# Patient Record
Sex: MALE | Race: ASIAN | NOT HISPANIC OR LATINO | URBAN - METROPOLITAN AREA
[De-identification: names, ages, dates, MRNs, and addresses within clinical notes are randomized per-mention and may not be internally consistent; named-entity substitution may affect disease eponyms.]

---
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
## 2019-11-25 NOTE — ED PROVIDER NOTE - CPE EDP CARDIAC NORM
[GI Symptoms] : GI SYMPTOMS [Decreased Appetite] : decreased appetite [Periumbilical] : periumbilical [___ Day(s)] : [unfilled] day(s) [Intermittent] : intermittent [Eating] : eating [Last Void: ___] : Last void: [unfilled] [Sick Contacts: ___] : sick contacts: [unfilled] [normal...

## 2020-07-08 NOTE — ED PROVIDER NOTE - PSYCHIATRIC LEVEL OF CONSCIOUSNESS
Patient's chart was reviewed for overdue ENA topics.  Immunizations reconciled.    Orders placed:n/a  Tasked appts:n/a  Labs Linked:n/a      
alert/follows commands

## 2023-11-04 ENCOUNTER — EMERGENCY (EMERGENCY)
Facility: HOSPITAL | Age: 23
LOS: 1 days | Discharge: HOME | End: 2023-11-04
Admitting: EMERGENCY MEDICINE
Payer: MEDICAID

## 2023-11-04 VITALS
SYSTOLIC BLOOD PRESSURE: 141 MMHG | TEMPERATURE: 98 F | DIASTOLIC BLOOD PRESSURE: 100 MMHG | HEART RATE: 115 BPM | OXYGEN SATURATION: 100 %

## 2023-11-04 DIAGNOSIS — F20.9 SCHIZOPHRENIA, UNSPECIFIED: ICD-10-CM

## 2023-11-04 LAB
ALBUMIN SERPL ELPH-MCNC: 4.8 G/DL — SIGNIFICANT CHANGE UP (ref 3.3–5)
ALBUMIN SERPL ELPH-MCNC: 4.8 G/DL — SIGNIFICANT CHANGE UP (ref 3.3–5)
ALP SERPL-CCNC: 70 U/L — SIGNIFICANT CHANGE UP (ref 40–120)
ALP SERPL-CCNC: 70 U/L — SIGNIFICANT CHANGE UP (ref 40–120)
ALT FLD-CCNC: 19 U/L — SIGNIFICANT CHANGE UP (ref 4–41)
ALT FLD-CCNC: 19 U/L — SIGNIFICANT CHANGE UP (ref 4–41)
AMPHET UR-MCNC: NEGATIVE — SIGNIFICANT CHANGE UP
AMPHET UR-MCNC: NEGATIVE — SIGNIFICANT CHANGE UP
ANION GAP SERPL CALC-SCNC: 10 MMOL/L — SIGNIFICANT CHANGE UP (ref 7–14)
ANION GAP SERPL CALC-SCNC: 10 MMOL/L — SIGNIFICANT CHANGE UP (ref 7–14)
APAP SERPL-MCNC: <10 UG/ML — LOW (ref 15–25)
APAP SERPL-MCNC: <10 UG/ML — LOW (ref 15–25)
APPEARANCE UR: ABNORMAL
APPEARANCE UR: ABNORMAL
AST SERPL-CCNC: 19 U/L — SIGNIFICANT CHANGE UP (ref 4–40)
AST SERPL-CCNC: 19 U/L — SIGNIFICANT CHANGE UP (ref 4–40)
BACTERIA # UR AUTO: NEGATIVE /HPF — SIGNIFICANT CHANGE UP
BACTERIA # UR AUTO: NEGATIVE /HPF — SIGNIFICANT CHANGE UP
BARBITURATES UR SCN-MCNC: NEGATIVE — SIGNIFICANT CHANGE UP
BARBITURATES UR SCN-MCNC: NEGATIVE — SIGNIFICANT CHANGE UP
BASOPHILS # BLD AUTO: 0.03 K/UL — SIGNIFICANT CHANGE UP (ref 0–0.2)
BASOPHILS # BLD AUTO: 0.03 K/UL — SIGNIFICANT CHANGE UP (ref 0–0.2)
BASOPHILS NFR BLD AUTO: 0.3 % — SIGNIFICANT CHANGE UP (ref 0–2)
BASOPHILS NFR BLD AUTO: 0.3 % — SIGNIFICANT CHANGE UP (ref 0–2)
BENZODIAZ UR-MCNC: NEGATIVE — SIGNIFICANT CHANGE UP
BENZODIAZ UR-MCNC: NEGATIVE — SIGNIFICANT CHANGE UP
BILIRUB SERPL-MCNC: 0.3 MG/DL — SIGNIFICANT CHANGE UP (ref 0.2–1.2)
BILIRUB SERPL-MCNC: 0.3 MG/DL — SIGNIFICANT CHANGE UP (ref 0.2–1.2)
BILIRUB UR-MCNC: NEGATIVE — SIGNIFICANT CHANGE UP
BILIRUB UR-MCNC: NEGATIVE — SIGNIFICANT CHANGE UP
BUN SERPL-MCNC: 11 MG/DL — SIGNIFICANT CHANGE UP (ref 7–23)
BUN SERPL-MCNC: 11 MG/DL — SIGNIFICANT CHANGE UP (ref 7–23)
CALCIUM SERPL-MCNC: 9.6 MG/DL — SIGNIFICANT CHANGE UP (ref 8.4–10.5)
CALCIUM SERPL-MCNC: 9.6 MG/DL — SIGNIFICANT CHANGE UP (ref 8.4–10.5)
CAST: 0 /LPF — SIGNIFICANT CHANGE UP (ref 0–4)
CAST: 0 /LPF — SIGNIFICANT CHANGE UP (ref 0–4)
CHLORIDE SERPL-SCNC: 101 MMOL/L — SIGNIFICANT CHANGE UP (ref 98–107)
CHLORIDE SERPL-SCNC: 101 MMOL/L — SIGNIFICANT CHANGE UP (ref 98–107)
CO2 SERPL-SCNC: 27 MMOL/L — SIGNIFICANT CHANGE UP (ref 22–31)
CO2 SERPL-SCNC: 27 MMOL/L — SIGNIFICANT CHANGE UP (ref 22–31)
COCAINE METAB.OTHER UR-MCNC: NEGATIVE — SIGNIFICANT CHANGE UP
COCAINE METAB.OTHER UR-MCNC: NEGATIVE — SIGNIFICANT CHANGE UP
COLOR SPEC: YELLOW — SIGNIFICANT CHANGE UP
COLOR SPEC: YELLOW — SIGNIFICANT CHANGE UP
CREAT SERPL-MCNC: 0.92 MG/DL — SIGNIFICANT CHANGE UP (ref 0.5–1.3)
CREAT SERPL-MCNC: 0.92 MG/DL — SIGNIFICANT CHANGE UP (ref 0.5–1.3)
CREATININE URINE RESULT, DAU: 158 MG/DL — SIGNIFICANT CHANGE UP
CREATININE URINE RESULT, DAU: 158 MG/DL — SIGNIFICANT CHANGE UP
DIFF PNL FLD: NEGATIVE — SIGNIFICANT CHANGE UP
DIFF PNL FLD: NEGATIVE — SIGNIFICANT CHANGE UP
EGFR: 119 ML/MIN/1.73M2 — SIGNIFICANT CHANGE UP
EGFR: 119 ML/MIN/1.73M2 — SIGNIFICANT CHANGE UP
EOSINOPHIL # BLD AUTO: 0.11 K/UL — SIGNIFICANT CHANGE UP (ref 0–0.5)
EOSINOPHIL # BLD AUTO: 0.11 K/UL — SIGNIFICANT CHANGE UP (ref 0–0.5)
EOSINOPHIL NFR BLD AUTO: 1.2 % — SIGNIFICANT CHANGE UP (ref 0–6)
EOSINOPHIL NFR BLD AUTO: 1.2 % — SIGNIFICANT CHANGE UP (ref 0–6)
ETHANOL SERPL-MCNC: <10 MG/DL — SIGNIFICANT CHANGE UP
ETHANOL SERPL-MCNC: <10 MG/DL — SIGNIFICANT CHANGE UP
GLUCOSE SERPL-MCNC: 124 MG/DL — HIGH (ref 70–99)
GLUCOSE SERPL-MCNC: 124 MG/DL — HIGH (ref 70–99)
GLUCOSE UR QL: NEGATIVE MG/DL — SIGNIFICANT CHANGE UP
GLUCOSE UR QL: NEGATIVE MG/DL — SIGNIFICANT CHANGE UP
HCT VFR BLD CALC: 41.8 % — SIGNIFICANT CHANGE UP (ref 39–50)
HCT VFR BLD CALC: 41.8 % — SIGNIFICANT CHANGE UP (ref 39–50)
HGB BLD-MCNC: 14.2 G/DL — SIGNIFICANT CHANGE UP (ref 13–17)
HGB BLD-MCNC: 14.2 G/DL — SIGNIFICANT CHANGE UP (ref 13–17)
IANC: 6.46 K/UL — SIGNIFICANT CHANGE UP (ref 1.8–7.4)
IANC: 6.46 K/UL — SIGNIFICANT CHANGE UP (ref 1.8–7.4)
IMM GRANULOCYTES NFR BLD AUTO: 0.4 % — SIGNIFICANT CHANGE UP (ref 0–0.9)
IMM GRANULOCYTES NFR BLD AUTO: 0.4 % — SIGNIFICANT CHANGE UP (ref 0–0.9)
KETONES UR-MCNC: NEGATIVE MG/DL — SIGNIFICANT CHANGE UP
KETONES UR-MCNC: NEGATIVE MG/DL — SIGNIFICANT CHANGE UP
LEUKOCYTE ESTERASE UR-ACNC: NEGATIVE — SIGNIFICANT CHANGE UP
LEUKOCYTE ESTERASE UR-ACNC: NEGATIVE — SIGNIFICANT CHANGE UP
LYMPHOCYTES # BLD AUTO: 1.79 K/UL — SIGNIFICANT CHANGE UP (ref 1–3.3)
LYMPHOCYTES # BLD AUTO: 1.79 K/UL — SIGNIFICANT CHANGE UP (ref 1–3.3)
LYMPHOCYTES # BLD AUTO: 20 % — SIGNIFICANT CHANGE UP (ref 13–44)
LYMPHOCYTES # BLD AUTO: 20 % — SIGNIFICANT CHANGE UP (ref 13–44)
MCHC RBC-ENTMCNC: 28 PG — SIGNIFICANT CHANGE UP (ref 27–34)
MCHC RBC-ENTMCNC: 28 PG — SIGNIFICANT CHANGE UP (ref 27–34)
MCHC RBC-ENTMCNC: 34 GM/DL — SIGNIFICANT CHANGE UP (ref 32–36)
MCHC RBC-ENTMCNC: 34 GM/DL — SIGNIFICANT CHANGE UP (ref 32–36)
MCV RBC AUTO: 82.4 FL — SIGNIFICANT CHANGE UP (ref 80–100)
MCV RBC AUTO: 82.4 FL — SIGNIFICANT CHANGE UP (ref 80–100)
METHADONE UR-MCNC: NEGATIVE — SIGNIFICANT CHANGE UP
METHADONE UR-MCNC: NEGATIVE — SIGNIFICANT CHANGE UP
MONOCYTES # BLD AUTO: 0.54 K/UL — SIGNIFICANT CHANGE UP (ref 0–0.9)
MONOCYTES # BLD AUTO: 0.54 K/UL — SIGNIFICANT CHANGE UP (ref 0–0.9)
MONOCYTES NFR BLD AUTO: 6 % — SIGNIFICANT CHANGE UP (ref 2–14)
MONOCYTES NFR BLD AUTO: 6 % — SIGNIFICANT CHANGE UP (ref 2–14)
NEUTROPHILS # BLD AUTO: 6.46 K/UL — SIGNIFICANT CHANGE UP (ref 1.8–7.4)
NEUTROPHILS # BLD AUTO: 6.46 K/UL — SIGNIFICANT CHANGE UP (ref 1.8–7.4)
NEUTROPHILS NFR BLD AUTO: 72.1 % — SIGNIFICANT CHANGE UP (ref 43–77)
NEUTROPHILS NFR BLD AUTO: 72.1 % — SIGNIFICANT CHANGE UP (ref 43–77)
NITRITE UR-MCNC: NEGATIVE — SIGNIFICANT CHANGE UP
NITRITE UR-MCNC: NEGATIVE — SIGNIFICANT CHANGE UP
NRBC # BLD: 0 /100 WBCS — SIGNIFICANT CHANGE UP (ref 0–0)
NRBC # BLD: 0 /100 WBCS — SIGNIFICANT CHANGE UP (ref 0–0)
NRBC # FLD: 0 K/UL — SIGNIFICANT CHANGE UP (ref 0–0)
NRBC # FLD: 0 K/UL — SIGNIFICANT CHANGE UP (ref 0–0)
OPIATES UR-MCNC: NEGATIVE — SIGNIFICANT CHANGE UP
OPIATES UR-MCNC: NEGATIVE — SIGNIFICANT CHANGE UP
OXYCODONE UR-MCNC: NEGATIVE — SIGNIFICANT CHANGE UP
OXYCODONE UR-MCNC: NEGATIVE — SIGNIFICANT CHANGE UP
PCP SPEC-MCNC: SIGNIFICANT CHANGE UP
PCP SPEC-MCNC: SIGNIFICANT CHANGE UP
PCP UR-MCNC: NEGATIVE — SIGNIFICANT CHANGE UP
PCP UR-MCNC: NEGATIVE — SIGNIFICANT CHANGE UP
PH UR: 7.5 — SIGNIFICANT CHANGE UP (ref 5–8)
PH UR: 7.5 — SIGNIFICANT CHANGE UP (ref 5–8)
PLATELET # BLD AUTO: 259 K/UL — SIGNIFICANT CHANGE UP (ref 150–400)
PLATELET # BLD AUTO: 259 K/UL — SIGNIFICANT CHANGE UP (ref 150–400)
POTASSIUM SERPL-MCNC: 4 MMOL/L — SIGNIFICANT CHANGE UP (ref 3.5–5.3)
POTASSIUM SERPL-MCNC: 4 MMOL/L — SIGNIFICANT CHANGE UP (ref 3.5–5.3)
POTASSIUM SERPL-SCNC: 4 MMOL/L — SIGNIFICANT CHANGE UP (ref 3.5–5.3)
POTASSIUM SERPL-SCNC: 4 MMOL/L — SIGNIFICANT CHANGE UP (ref 3.5–5.3)
PROT SERPL-MCNC: 7.7 G/DL — SIGNIFICANT CHANGE UP (ref 6–8.3)
PROT SERPL-MCNC: 7.7 G/DL — SIGNIFICANT CHANGE UP (ref 6–8.3)
PROT UR-MCNC: NEGATIVE MG/DL — SIGNIFICANT CHANGE UP
PROT UR-MCNC: NEGATIVE MG/DL — SIGNIFICANT CHANGE UP
RBC # BLD: 5.07 M/UL — SIGNIFICANT CHANGE UP (ref 4.2–5.8)
RBC # BLD: 5.07 M/UL — SIGNIFICANT CHANGE UP (ref 4.2–5.8)
RBC # FLD: 12.3 % — SIGNIFICANT CHANGE UP (ref 10.3–14.5)
RBC # FLD: 12.3 % — SIGNIFICANT CHANGE UP (ref 10.3–14.5)
RBC CASTS # UR COMP ASSIST: 1 /HPF — SIGNIFICANT CHANGE UP (ref 0–4)
RBC CASTS # UR COMP ASSIST: 1 /HPF — SIGNIFICANT CHANGE UP (ref 0–4)
SALICYLATES SERPL-MCNC: <0.3 MG/DL — LOW (ref 15–30)
SALICYLATES SERPL-MCNC: <0.3 MG/DL — LOW (ref 15–30)
SARS-COV-2 RNA SPEC QL NAA+PROBE: SIGNIFICANT CHANGE UP
SARS-COV-2 RNA SPEC QL NAA+PROBE: SIGNIFICANT CHANGE UP
SODIUM SERPL-SCNC: 138 MMOL/L — SIGNIFICANT CHANGE UP (ref 135–145)
SODIUM SERPL-SCNC: 138 MMOL/L — SIGNIFICANT CHANGE UP (ref 135–145)
SP GR SPEC: 1.02 — SIGNIFICANT CHANGE UP (ref 1–1.03)
SP GR SPEC: 1.02 — SIGNIFICANT CHANGE UP (ref 1–1.03)
SQUAMOUS # UR AUTO: 0 /HPF — SIGNIFICANT CHANGE UP (ref 0–5)
SQUAMOUS # UR AUTO: 0 /HPF — SIGNIFICANT CHANGE UP (ref 0–5)
THC UR QL: NEGATIVE — SIGNIFICANT CHANGE UP
THC UR QL: NEGATIVE — SIGNIFICANT CHANGE UP
TOXICOLOGY SCREEN, DRUGS OF ABUSE, SERUM RESULT: SIGNIFICANT CHANGE UP
TOXICOLOGY SCREEN, DRUGS OF ABUSE, SERUM RESULT: SIGNIFICANT CHANGE UP
TSH SERPL-MCNC: 1.37 UIU/ML — SIGNIFICANT CHANGE UP (ref 0.27–4.2)
TSH SERPL-MCNC: 1.37 UIU/ML — SIGNIFICANT CHANGE UP (ref 0.27–4.2)
UROBILINOGEN FLD QL: 1 MG/DL — SIGNIFICANT CHANGE UP (ref 0.2–1)
UROBILINOGEN FLD QL: 1 MG/DL — SIGNIFICANT CHANGE UP (ref 0.2–1)
WBC # BLD: 8.97 K/UL — SIGNIFICANT CHANGE UP (ref 3.8–10.5)
WBC # BLD: 8.97 K/UL — SIGNIFICANT CHANGE UP (ref 3.8–10.5)
WBC # FLD AUTO: 8.97 K/UL — SIGNIFICANT CHANGE UP (ref 3.8–10.5)
WBC # FLD AUTO: 8.97 K/UL — SIGNIFICANT CHANGE UP (ref 3.8–10.5)
WBC UR QL: 2 /HPF — SIGNIFICANT CHANGE UP (ref 0–5)
WBC UR QL: 2 /HPF — SIGNIFICANT CHANGE UP (ref 0–5)

## 2023-11-04 PROCEDURE — 99285 EMERGENCY DEPT VISIT HI MDM: CPT | Mod: GC

## 2023-11-04 PROCEDURE — 93010 ELECTROCARDIOGRAM REPORT: CPT

## 2023-11-04 PROCEDURE — 99285 EMERGENCY DEPT VISIT HI MDM: CPT

## 2023-11-04 RX ORDER — GABAPENTIN 400 MG/1
200 CAPSULE ORAL AT BEDTIME
Refills: 0 | Status: DISCONTINUED | OUTPATIENT
Start: 2023-11-04 | End: 2023-11-08

## 2023-11-04 RX ORDER — SERTRALINE 25 MG/1
50 TABLET, FILM COATED ORAL DAILY
Refills: 0 | Status: DISCONTINUED | OUTPATIENT
Start: 2023-11-04 | End: 2023-11-08

## 2023-11-04 RX ORDER — BENZTROPINE MESYLATE 1 MG
1 TABLET ORAL DAILY
Refills: 0 | Status: DISCONTINUED | OUTPATIENT
Start: 2023-11-04 | End: 2023-11-08

## 2023-11-04 RX ORDER — RISPERIDONE 4 MG/1
2 TABLET ORAL
Refills: 0 | Status: DISCONTINUED | OUTPATIENT
Start: 2023-11-04 | End: 2023-11-08

## 2023-11-04 RX ORDER — LURASIDONE HYDROCHLORIDE 40 MG/1
20 TABLET ORAL AT BEDTIME
Refills: 0 | Status: DISCONTINUED | OUTPATIENT
Start: 2023-11-04 | End: 2023-11-08

## 2023-11-04 RX ORDER — GABAPENTIN 400 MG/1
100 CAPSULE ORAL DAILY
Refills: 0 | Status: DISCONTINUED | OUTPATIENT
Start: 2023-11-04 | End: 2023-11-08

## 2023-11-04 RX ADMIN — GABAPENTIN 200 MILLIGRAM(S): 400 CAPSULE ORAL at 23:07

## 2023-11-04 RX ADMIN — SERTRALINE 50 MILLIGRAM(S): 25 TABLET, FILM COATED ORAL at 18:58

## 2023-11-04 RX ADMIN — RISPERIDONE 2 MILLIGRAM(S): 4 TABLET ORAL at 18:58

## 2023-11-04 NOTE — ED BEHAVIORAL HEALTH ASSESSMENT NOTE - SUMMARY
Patient is a 24 yo male, single, lives with family, graduated college with bachelors in computer science, currently unemployed, with a PPHx of Schizophrenia, 1 past psych admission ([facility] in 01/2023), no h/o SA, no NSSIB, no legal history, no substance use, who presents to ED BIB family for agitation.     Patient presents with thought disorder with disorganized thought process, psychotic ambivalence, unable to give proper history, though admits to recent irritability and damage towards property. Family report that patient has been irritable for months, has had recent damage towards property which resulted in him bruising his hand, and has been having delusions. They advocate for admission. Patient requires inpatient psychiatric admission due to psychosis, damage towards property, presents as a danger to himself due to disorganized behaviors and delusions. Patient is a 22 yo male, single, lives with family, graduated college with bachelors in computer science, currently unemployed, with a PPHx of Schizophrenia, 1 past psych admission ([facility] in 01/2023), no h/o SA, no NSSIB, no legal history, no substance use, who presents to ED BIB family for agitation. Patient presents with thought disorder with disorganized thought process, psychotic ambivalence, unable to give proper history, though admits to recent irritability and damage towards property. Family report that patient has been irritable for months, has had recent damage towards property which resulted in him bruising his hand, and has been having delusions. They advocate for admission. Patient requires inpatient psychiatric admission due to psychosis, damage towards property, presents as a danger to himself due to disorganized behaviors and delusions.

## 2023-11-04 NOTE — ED BEHAVIORAL HEALTH ASSESSMENT NOTE - PSYCHIATRIC ISSUES AND PLAN (INCLUDE STANDING AND PRN MEDICATION)
Increase PM Risperdal dose to 3mg, decrease Latuda to 20mg with dinner, c/w Risperdal 1mg qAM, gabapentin 100mg qAM, 200mg qhs, Zoloft 50mg qAM, Cogentin 1mg qhs

## 2023-11-04 NOTE — ED ADULT TRIAGE NOTE - CHIEF COMPLAINT QUOTE
pt with schizophrenia, med compliant. brought in by sister. pt believes he is the profit from [employer] and the [place] is after him. pt has been punching walls in the house.

## 2023-11-04 NOTE — ED BEHAVIORAL HEALTH ASSESSMENT NOTE - CURRENT MEDICATION
Risperdal 1mg qAM, 2mg qPM, gabapentin 100mg qAM, 200mg qhs, Zoloft 50mg qAM, Cogentin 1mg qhs, Lurasidone 40mg qhs

## 2023-11-04 NOTE — ED PROVIDER NOTE - PROGRESS NOTE DETAILS
[place] ALEJANDRO: Received sign out from my colleague Dr. Leonard pt boarding in  area for w c/f de comp schizophrenia, labs reviewed, pt resting comfortable in  area. YOLI: Pt was signed out to me pending admission/transfer to an inpatient psych facility. Pt has no complaints. Will continue to monitor pending bed availability. Med cleared by labs.

## 2023-11-04 NOTE — ED ADULT NURSE NOTE - OBJECTIVE STATEMENT
Hx: Schizophrenia. Pt from home endorsing argument that never got physical, stating his family brought him here. Pt denies getting physical/destroying property contrary to triage note. Endorses med noncompliance. Appears paranoid/skeptical. Denies; S/I, SIB, H/I, hallucinations, ETOH/drug use, insomnia, decrease in appetite.

## 2023-11-04 NOTE — ED PROVIDER NOTE - CLINICAL SUMMARY MEDICAL DECISION MAKING FREE TEXT BOX
24 y/o M  hx Schizophrenia   Labs, Urine Tox/UA, EKG. X-ray hand    Medical evaluation performed. There is no clinical evidence of intoxication or any acute medical problem requiring immediate intervention. Patient is awaiting psychiatric consultation. Final disposition will be determined by psychiatrist. 22 y/o M  hx Schizophrenia   Labs, Urine Tox/UA, EKG. X-ray hand    Punched wall / Redness and swelling to 3rd & 5th  metacarpals.    Medical evaluation performed. There is no clinical evidence of intoxication or any acute medical problem requiring immediate intervention. Patient is awaiting psychiatric consultation. Final disposition will be determined by psychiatrist.

## 2023-11-04 NOTE — ED BEHAVIORAL HEALTH ASSESSMENT NOTE - DESCRIPTION
Lives with 2 parents and older sister, graduated from [employer] in 2022 with bachelors in computer science Low iron Pt calm and cooperative in ED

## 2023-11-04 NOTE — ED BEHAVIORAL HEALTH ASSESSMENT NOTE - DETAILS
Pending available bed Paternal aunt with psychiatric illness, likely depression or anxiety, on Zoloft Damaging rooms at home Spoke with family in ED Pt denies

## 2023-11-04 NOTE — ED PROVIDER NOTE - OBJECTIVE STATEMENT
24 y/o M  hx Schizophrenia  BIBA secondary to a " schizophrenic attack". Appears paranoid and internally preoccupied. Denies SI/HI/AH/VH. Admits to punching a wall . Denies falling, or kicking any objects. Denies pain, SOB, fever, chills, dizziness , chest/abdominal discomfort.  Denies use of alcohol  or illicit drugs.

## 2023-11-04 NOTE — ED BEHAVIORAL HEALTH ASSESSMENT NOTE - RISK ASSESSMENT
Risk factors:   Modifiable: unable to care for self 2/2 psychiatric illness, h/o schizophrenia  Nonmodifiable: h/o psych admissions    Protective factors: no current SI, no h/o SA/SIB, no access to weapons, no active substance abuse, good physical health, domiciled, engaged in treatment, adherent with treatment    Overall, pt is at moderate risk of harm and meets criteria for psychiatric admission.

## 2023-11-04 NOTE — ED BEHAVIORAL HEALTH ASSESSMENT NOTE - NSBHATTESTCOMMENTATTENDFT_PSY_A_CORE
Patient also seen by Attending. Bizarre scheduling of psychiatric agents. Patient's med mgt is by a PCP and given Risperdal 1mg qAM, 2mg qPM, gabapentin 100mg qAM, 200mg qhs, Zoloft 50mg qAM, Cogentin 1mg qhs, Lurasidone 40mg qhs...polypharmacy should be avoided. Thus would cross taper Latuda to risperdal and try to maximize that / use a theraputic dose range first before adding on a second antipsychotic. Moreover, rigid daily schedule of multiple agents is not user friendly and would result in noncompliance should Patient leave the house to work/go to program etc. Decrease Latuda to 20mg qhs increase risperdal to 2mg PO bid and continue. Next, would re-evaluate the use of Zoloft - it is at a subtheraputic dose so either take it off if not clinically indicated or taper to theraputic range. Patient also seen by Attending. Bizarre scheduling of psychiatric agents, also agents of choice could be different. Patient's med mgt is by a PCP and given Risperdal 1mg qAM, 2mg qPM, gabapentin 100mg qAM, 200mg qhs, Zoloft 50mg qAM, Cogentin 1mg qhs, Lurasidone 40mg qhs...polypharmacy should be avoided. Thus would cross taper Latuda to risperdal and try to maximize that / use a theraputic dose range first before adding on a second antipsychotic. Moreover, rigid daily schedule of multiple agents is not user friendly and would result in noncompliance should Patient leave the house to work/go to program etc. Decrease Latuda to 20mg qhs increase risperdal to 2mg PO bid and continue. Next, would re-evaluate the use of Zoloft - it is at a subtheraputic dose so either take it off if not clinically indicated or taper to theraputic range. Same goes for neurontin.

## 2023-11-04 NOTE — ED BEHAVIORAL HEALTH ASSESSMENT NOTE - HPI (INCLUDE ILLNESS QUALITY, SEVERITY, DURATION, TIMING, CONTEXT, MODIFYING FACTORS, ASSOCIATED SIGNS AND SYMPTOMS)
Patient is a 24 yo male, single, lives with family, graduated college with bachelors in computer science, currently unemployed, with a PPHx of Schizophrenia, 1 past psych admission ([facility] in 01/2023), no h/o SA, no NSSIB, no legal history, no substance use, who presents to ED BIB family for agitation.     Patient is a poor historian during interview, frequently changing answers to questions. He admits that he did punch a wall yesterday, states that it was because he ended up washing his hands twice in short period of time because he forgot that he washed his hands the first time, was mad at himself when he realized this, and ended up punching a wall. He initially admits to doing this several times in the past, but then denies it. He initially reports SI then denies. He reports his diagnosis is schizoaffective disorder, states that he was hospitalized 2x, initially says last was at [facility] but retracts this and states he doesn't remember where he was hospitalized. He reports taking Zoloft, Risperdal, and Cogentin, reports adherence to them. When asked about increase in energy, he initially reports having fluctuating energy, then reports having stable energy. He denies depression, denies HI, denies AVH, but then states that in 2008 he had "color disorientation" where he "saw colors side by side which came close and saw what is in common". He denies drug use.    Spoke with 2 parents and sister who were present in the ED. Patient is a 24 yo male, single, lives with family, graduated college with bachelors in computer science, currently unemployed, with a PPHx of Schizophrenia, 1 past psych admission ([facility] in 01/2023), no h/o SA, no NSSIB, no legal history, no substance use, who presents to ED BIB family for agitation.     Patient is a poor historian during interview, frequently changing answers to questions. He admits that he did punch a wall yesterday, states that it was because he ended up washing his hands twice in short period of time because he forgot that he washed his hands the first time, was mad at himself when he realized this, and ended up punching a wall. He initially admits to doing this several times in the past, but then denies it. He initially reports SI then denies. He reports his diagnosis is schizoaffective disorder, states that he was hospitalized 2x, initially says last was at [facility] but retracts this and states he doesn't remember where he was hospitalized. He reports taking Zoloft, Risperdal, and Cogentin, reports adherence to them. When asked about increase in energy, he initially reports having fluctuating energy, then reports having stable energy. He denies depression, denies HI, denies AVH, but then states that in 2008 he had "color disorientation" where he "saw colors side by side which came close and saw what is in common". He denies drug use. Informed pt of need for inpatient psychiatric admission.     Spoke with 2 parents and sister who were present in the ED. They states that pt was first diagnosed with depression in 2017 where they noticed he would be reclusive, often hide himself from light. They state in 2018 he was diagnosed with paranoid personality disorder as he would often get paranoid, be fearful, worry about someone entering their house through the window. They state he was hospitalized for 1.5 weeks at [facility] in 01/2023 (only hospitalization) for psychosis, was diagnosed with schizophrenia, as he had CAH to kill himself along with paranoia and irritability. They report that patient appeared to be improved by inpatient psychiatrists there, but once he went home appeared unwell. They state that since discharge, he has been irritable nearly everyday, often punching walls. They state that he would often put his hands up to box, which they feel like is threatening, but deny him getting physical with family, though he often remains irritable towards them. They only brought him in today because his hand has become bruised, state that he was up all night last night banging the walls. They also report he has been having varying degrees of delusions, reporting that he stated he was the son of God 2 days ago, then stated the [place] is after him yesterday. They state his PCP prescribes his psychotropic medications. They advocate for inpatient psychiatric admission as they are worried he is a danger to himself.

## 2023-11-05 PROCEDURE — 73130 X-RAY EXAM OF HAND: CPT | Mod: 26,RT

## 2023-11-05 RX ADMIN — RISPERIDONE 2 MILLIGRAM(S): 4 TABLET ORAL at 17:29

## 2023-11-05 RX ADMIN — GABAPENTIN 100 MILLIGRAM(S): 400 CAPSULE ORAL at 11:04

## 2023-11-05 RX ADMIN — LURASIDONE HYDROCHLORIDE 20 MILLIGRAM(S): 40 TABLET ORAL at 01:29

## 2023-11-05 RX ADMIN — RISPERIDONE 2 MILLIGRAM(S): 4 TABLET ORAL at 06:18

## 2023-11-05 RX ADMIN — LURASIDONE HYDROCHLORIDE 20 MILLIGRAM(S): 40 TABLET ORAL at 23:54

## 2023-11-05 RX ADMIN — Medication 1 MILLIGRAM(S): at 11:04

## 2023-11-05 RX ADMIN — GABAPENTIN 200 MILLIGRAM(S): 400 CAPSULE ORAL at 23:54

## 2023-11-05 RX ADMIN — SERTRALINE 50 MILLIGRAM(S): 25 TABLET, FILM COATED ORAL at 11:05

## 2023-11-05 NOTE — ED ADULT NURSE REASSESSMENT NOTE - NS ED NURSE REASSESS COMMENT FT1
Received report from PM RN. Patient is alert and oriented times three. Patient offered and accepted breakfast. Medications given as ordered. Patient is comfortable, VS stable and patient has no complaints at this time. Waiting for bed assignment.  JESSY Brothers

## 2023-11-05 NOTE — ED ADULT NURSE REASSESSMENT NOTE - NS ED NURSE REASSESS COMMENT FT1
Pt appears to be resting comfortably, NAD, respirations are even and unlabored, no complaints at this moment, VS noted, medicated as per orders, pt boarding, awaiting bed assignment, Safety precautions implemented as per protocol.

## 2023-11-05 NOTE — ED BEHAVIORAL HEALTH PROGRESS NOTE - DETAILS
family aware of impending psychiatric admission which they are in support of  to be done once a bed is obtained

## 2023-11-06 VITALS — HEART RATE: 88 BPM | TEMPERATURE: 98 F | DIASTOLIC BLOOD PRESSURE: 80 MMHG | SYSTOLIC BLOOD PRESSURE: 122 MMHG

## 2023-11-06 RX ADMIN — SERTRALINE 50 MILLIGRAM(S): 25 TABLET, FILM COATED ORAL at 11:09

## 2023-11-06 RX ADMIN — Medication 1 MILLIGRAM(S): at 11:08

## 2023-11-06 RX ADMIN — GABAPENTIN 100 MILLIGRAM(S): 400 CAPSULE ORAL at 11:08

## 2023-11-06 RX ADMIN — RISPERIDONE 2 MILLIGRAM(S): 4 TABLET ORAL at 07:09

## 2023-11-06 NOTE — ED ADULT NURSE REASSESSMENT NOTE - NS ED NURSE REASSESS COMMENT FT1
Received report from PM RN. Patient is calm and cooperative. Breakfast offered and accepted. Patient has a bed assignment at [address] and report given. EMS to transfer patient with personal belongings.   JESSY Sahni

## 2023-11-06 NOTE — ED BEHAVIORAL HEALTH PROGRESS NOTE - CASE SUMMARY/FORMULATION (CLEARLY DOCUMENT RATIONALE FOR DISPOSITION CHANGE)
Bizarre scheduling of psychiatric agents, also agents of choice could be different. Patient's med mgt is by a PCP and given Risperdal 1mg qAM, 2mg qPM, gabapentin 100mg qAM, 200mg qhs, Zoloft 50mg qAM, Cogentin 1mg qhs, Lurasidone 40mg qhs at different exact times throughout the day...polypharmacy should be avoided.  - would cross taper Latuda to risperdal and try to maximize that / use a theraputic dose range first before adding on a second antipsychotic. Moreover, rigid daily schedule of multiple agents is not user friendly and would result in noncompliance should Patient leave the house to work/go to program etc. Decrease Latuda to 20mg qhs increase risperdal to 2mg PO bid and continue. Next, would re-evaluate the use of Zoloft - it is at a subtheraputic dose so either take it off if not clinically indicated or taper to theraputic range. Same goes for neurontin.  - clinical presentation raises index of suspicion of possible PDD/Spectrum as well which has not been formally noted/diagnosed.   
Bizarre scheduling of psychiatric agents, also agents of choice could be different. Patient's med mgt is by a PCP and given Risperdal 1mg qAM, 2mg qPM, gabapentin 100mg qAM, 200mg qhs, Zoloft 50mg qAM, Cogentin 1mg qhs, Lurasidone 40mg qhs at different exact times throughout the day...polypharmacy should be avoided.  - would cross taper Latuda to risperdal and try to maximize that / use a therapeutic dose range first before adding on a second antipsychotic. Moreover, rigid daily schedule of multiple agents is not user friendly and would result in noncompliance should Patient leave the house to work/go to program etc. Decrease Latuda to 20mg qhs increase risperdal to 2mg PO bid and continue. Next, would re-evaluate the use of Zoloft - it is at a subtheraputic dose so either take it off if not clinically indicated or taper to therapeutic range. Same goes for neurontin.  - clinical presentation raises index of suspicion of possible PDD/Spectrum as well which has not been formally noted/diagnosed.

## 2023-11-06 NOTE — ED BEHAVIORAL HEALTH NOTE - BEHAVIORAL HEALTH NOTE
WRiter called patient's father [phone] left a voicemail informing him patient was admitted to [facility], provided hospital address and phone number on voicemail as well as writers spectra for callback if needed.

## 2023-11-06 NOTE — ED BEHAVIORAL HEALTH PROGRESS NOTE - PSYCHIATRIC ISSUES AND PLAN (INCLUDE STANDING AND PRN MEDICATION)
began cross taper from Latuda to risperdal to avoid polypharmacy, simplify regimen. Also the role out neurontin and subtheraputic dose of Zoloft unclear in this case
began cross taper from Latuda to risperdal to avoid polypharmacy, simplify regimen. Also the role out neurontin and subtheraputic dose of Zoloft unclear in this case

## 2023-11-06 NOTE — ED BEHAVIORAL HEALTH PROGRESS NOTE - THERAPEUTIC INTERVENTIONS RECEIVED IN ED
Changes to Medication Regimen.../Supportive Therapy...
Changes to Medication Regimen.../Supportive Therapy...

## 2023-11-06 NOTE — ED BEHAVIORAL HEALTH PROGRESS NOTE - SUMMARY
acute high risk to self/others given recent irritability, impulsivity, and damage towards property, not seeing a psychiatrist as receiving PCP-managed odd psychiatric regimen. Family report that patient has been irritable for months, has had recent damage towards property which resulted in him bruising his hand, and has been having delusions. They advocate for admission. Patient requires inpatient psychiatric admission due to psychosis, damage towards property, presents as a danger to himself due to disorganized behaviors and delusions.
acute high risk to self/others given recent irritability, impulsivity, and damage towards property, not seeing a psychiatrist as receiving PCP-managed odd psychiatric regimen. Family report that patient has been irritable for months, has had recent damage towards property which resulted in him bruising his hand, and has been having delusions. They advocate for admission. Patient requires inpatient psychiatric admission due to psychosis, damage towards property, presents as a danger to himself due to disorganized behaviors and delusions.

## 2023-11-06 NOTE — ED BEHAVIORAL HEALTH PROGRESS NOTE - DETAILS
to be done once a bed is obtained  family aware of impending psychiatric admission which they are in support of

## 2023-11-06 NOTE — ED BEHAVIORAL HEALTH PROGRESS NOTE - BILLING CODES
99282-Emergency department visit - low complexity
99283-Emergency department visit - moderate complexity, non-urgent evaluation

## 2023-11-06 NOTE — ED BEHAVIORAL HEALTH PROGRESS NOTE - STANDING MEDS RECEIVED IN ED DETAILS FREE TEXT
simplifying regimen to avoid polypharmacy: increased Risperdal to 2mg PO bid and decrease Latuda to 20mg with dinner, continue gabapentin 100mg qAM, 200mg qhs, Zoloft 50mg qAM, Cogentin 1mg qhs  
simplifying regimen to avoid polypharmacy: increased Risperdal to 2mg PO bid and decrease Latuda to 20mg with dinner, continue gabapentin 100mg qAM, 200mg qhs, Zoloft 50mg qAM, Cogentin 1mg qhs

## 2023-11-06 NOTE — ED BEHAVIORAL HEALTH PROGRESS NOTE - TRANSFER ATTEMPTS TO INPATIENT BH SINCE LAST ASSESSMENT DETAIL FREE TEXT
[facility] initially sad they had a male bed then said they did not 
[facility] initially sad they had a male bed then said they did not

## 2023-11-06 NOTE — ED BEHAVIORAL HEALTH PROGRESS NOTE - TRANSFER ATTEMPTS TO INPATIENT BH SINCE LAST ASSESSMENT
[facility].../[facility].../[facility].../[facility].../[facility].../[facility].../[facility]...
[facility].../[facility].../[facility].../[facility].../[facility].../[place].../[facility]...

## 2023-11-06 NOTE — ED BEHAVIORAL HEALTH PROGRESS NOTE - NSBHATTESTCOMMENTATTENDFT_PSY_A_CORE
Pt with a hx of schizophrenia, not functioning at his baseline, recently aggressive and destructive of property despite compliance with psychotropics/  Pt at elevated risk for harm to others. Transferred to [place] on 9.27

## 2023-11-06 NOTE — ED BEHAVIORAL HEALTH PROGRESS NOTE - DETAILS:
calm, cooperative, reports he slept well, no complaints, ate breakfast. Took all of his standing medications and did not need any PRNs. Engages somewhat oddly, intermittent eye contact, semi-reliable historian. Example: talks about washing his hands once in the morning, but feels like he does not need to rest of the day; says "yes" when asked if there is an associated ritual / specific thought but cannot elaborate. Very concrete. Raises index of suspicion of possible PDD/Spectrum as well which has not been formally noted/diagnosed. 
On exam, patient is calm and cooperative. Reports good sleep and appetite. Patient did not received PRN medication and accepted standing medications during interview, this morning. Patient is thought-disordered, limiting interview. Patient endorses AH (non-command) in nature, but is not able to elaborate on content of AH. Patient states he had been "banging walls and has schizophrenia" leading to his ED visit. Reports AH were stable prior to ED visit and have been unchanged from baseline. When asked about reason of SIB, patient provides illogical answer in which he states "everything is in equilibrium".

## 2024-11-06 NOTE — ED BEHAVIORAL HEALTH ASSESSMENT NOTE - AXIS III
Azithromycin Pregnancy And Lactation Text: This medication is considered safe during pregnancy and is also secreted in breast milk. N/a

## 2024-11-29 NOTE — ED PROVIDER NOTE - CROS ED ROS STATEMENT
"      Writer went to pt's room to check vitals and do assessments. Up on entering pt's room pt was not present in the room. Writer and other ED staff  searched for the pt checking batrooms and various areas in the ED. Pt was nowhere to be found. Pt had apparently gone to [facility]  [facility] and informed the RN's that she was going out to get the car from the parking lot as her family member was being discharged shortly. Pt had a vallet ticket in her hand that she used to show RN and stated that she was going to get her car and return to pick her family member. RN opened secure door for her and pt left ED. Pt still had R PIV intact.  Provider was notified.     Pt Status    /70 (BP Location: Left arm, Patient Position: Supine, Cuff Size: Adult Regular)   Pulse 74   Temp 97.6  F (36.4  C) (Oral)   Resp 16   Ht 1.6 m (5' 3\")   Wt 59 kg (130 lb)   LMP 11/19/2024   SpO2 100%   BMI 23.03 kg/m        Pt alert and oriented x4, RA, VSS, Afebrile, ambulatory.   " all other ROS negative except as per HPI